# Patient Record
Sex: FEMALE | Race: WHITE | Employment: FULL TIME | URBAN - METROPOLITAN AREA
[De-identification: names, ages, dates, MRNs, and addresses within clinical notes are randomized per-mention and may not be internally consistent; named-entity substitution may affect disease eponyms.]

---

## 2017-01-22 ENCOUNTER — PATIENT MESSAGE (OUTPATIENT)
Dept: FAMILY MEDICINE CLINIC | Facility: CLINIC | Age: 42
End: 2017-01-22

## 2017-01-23 NOTE — TELEPHONE ENCOUNTER
From: Damien Monet  To: Roopa Domínguez NP  Sent: 1/22/2017 7:07 PM CST  Subject: Visit Follow-up Question    Dear Juve Bowser,  I went in to have LFTs drawn on Saturday but they said there is no longer an order for this lab to be draw

## 2017-02-28 ENCOUNTER — OFFICE VISIT (OUTPATIENT)
Dept: FAMILY MEDICINE CLINIC | Facility: CLINIC | Age: 42
End: 2017-02-28

## 2017-02-28 VITALS
TEMPERATURE: 98 F | OXYGEN SATURATION: 99 % | RESPIRATION RATE: 18 BRPM | BODY MASS INDEX: 25.1 KG/M2 | HEIGHT: 64 IN | WEIGHT: 147 LBS | HEART RATE: 76 BPM | DIASTOLIC BLOOD PRESSURE: 72 MMHG | SYSTOLIC BLOOD PRESSURE: 124 MMHG

## 2017-02-28 DIAGNOSIS — J01.40 ACUTE PANSINUSITIS, RECURRENCE NOT SPECIFIED: Primary | ICD-10-CM

## 2017-02-28 PROCEDURE — 99213 OFFICE O/P EST LOW 20 MIN: CPT | Performed by: PHYSICIAN ASSISTANT

## 2017-02-28 RX ORDER — AMOXICILLIN AND CLAVULANATE POTASSIUM 875; 125 MG/1; MG/1
1 TABLET, FILM COATED ORAL 2 TIMES DAILY
Qty: 20 TABLET | Refills: 0 | Status: SHIPPED | OUTPATIENT
Start: 2017-02-28 | End: 2017-03-10

## 2017-02-28 RX ORDER — FLUTICASONE PROPIONATE 50 MCG
2 SPRAY, SUSPENSION (ML) NASAL DAILY
Qty: 16 G | Refills: 0 | Status: SHIPPED | OUTPATIENT
Start: 2017-02-28 | End: 2017-03-05

## 2017-02-28 NOTE — PATIENT INSTRUCTIONS
Acute Sinusitis    Acute sinusitis is irritation and swelling of the sinuses. It is usually caused by a viral infection after a common cold. Your doctor can help you find relief. What is acute sinusitis?   Sinuses are air-filled spaces in the skull behin © 2682-0788 [address], [address]. All rights reserved. This information is not intended as a substitute for professional medical care. Always follow your healthcare professional's instructions.

## 2017-02-28 NOTE — PROGRESS NOTES
Memo Reyes is a 39year old female. Patient presents with:  Sinus Problem: fevers on/off, URI, x 2 weeks    HPI:    Symptoms started 2 weeks ago and worsening. Reports nasal congestion and mild dry cough.  Now with  purulent nasal discharge, maxi Sig: Take 1 tablet by mouth 2 (two) times daily. Fluticasone Propionate 50 MCG/ACT Nasal Suspension 16 g 0      Si sprays by Nasal route daily.            Imaging & Consults:  None    Patient Instructions     Acute Sinusitis    Acute sinusitis i Treatment is aimed at unblocking the sinus opening and helping the cilia work again. You may need to take antihistamine and decongestant medicine. These can reduce inflammation and decrease the amount of fluid your sinuses make.  If you have a bacterial inf

## 2017-03-02 ENCOUNTER — TELEPHONE (OUTPATIENT)
Dept: FAMILY MEDICINE CLINIC | Facility: CLINIC | Age: 42
End: 2017-03-02

## 2017-03-02 NOTE — TELEPHONE ENCOUNTER
Patient called and given instructions. Understands and takes a probiotic- will increase the probiotic to 2 a day while on Augmentin.

## 2017-03-02 NOTE — TELEPHONE ENCOUNTER
Pt should take a probiotic   Take only one abx for 2 days and then increase to bid after 2-3 days on probiotic  All abx can cause diarrhea

## 2017-03-02 NOTE — TELEPHONE ENCOUNTER
Instructed patient to stop medication and we will ask Dr. Karen Martin if she could rx something else.  Please advise

## 2017-04-08 ENCOUNTER — OFFICE VISIT (OUTPATIENT)
Dept: FAMILY MEDICINE CLINIC | Facility: CLINIC | Age: 42
End: 2017-04-08

## 2017-04-08 VITALS
TEMPERATURE: 98 F | DIASTOLIC BLOOD PRESSURE: 64 MMHG | HEART RATE: 74 BPM | HEIGHT: 64 IN | WEIGHT: 150 LBS | RESPIRATION RATE: 16 BRPM | BODY MASS INDEX: 25.61 KG/M2 | SYSTOLIC BLOOD PRESSURE: 122 MMHG

## 2017-04-08 DIAGNOSIS — R30.0 DYSURIA: Primary | ICD-10-CM

## 2017-04-08 PROCEDURE — 99213 OFFICE O/P EST LOW 20 MIN: CPT | Performed by: INTERNAL MEDICINE

## 2017-04-08 PROCEDURE — 81003 URINALYSIS AUTO W/O SCOPE: CPT | Performed by: INTERNAL MEDICINE

## 2017-04-08 PROCEDURE — 87086 URINE CULTURE/COLONY COUNT: CPT | Performed by: INTERNAL MEDICINE

## 2017-04-08 RX ORDER — CIPROFLOXACIN 500 MG/1
500 TABLET, FILM COATED ORAL 2 TIMES DAILY
Qty: 14 TABLET | Refills: 0 | Status: SHIPPED | OUTPATIENT
Start: 2017-04-08 | End: 2017-04-15

## 2017-04-08 NOTE — PROGRESS NOTES
HPI:   Damien Monet is a 39year old female who presents with urinary symptoms for 2 days. Complains of + urinary frequency, + urgency, mild dysuria, + suprapubic pressure. Denies back pain. ++ hematuria. Denies fever.     Diabetic:no     C

## 2018-10-17 ENCOUNTER — TELEPHONE (OUTPATIENT)
Dept: FAMILY MEDICINE CLINIC | Facility: CLINIC | Age: 43
End: 2018-10-17

## 2018-10-17 NOTE — TELEPHONE ENCOUNTER
[facility]ness labs dated 10-3-18 received, reviewed per [facility]: elevated FLTs,  Low platelets, elevated Cholesterol, Repeat CMP, CBC, Lipid in 3 months. Sent to scan, copy on  [facility] Triage Shelf.

## 2019-05-09 ENCOUNTER — OFFICE VISIT (OUTPATIENT)
Dept: FAMILY MEDICINE CLINIC | Facility: CLINIC | Age: 44
End: 2019-05-09
Payer: COMMERCIAL

## 2019-05-09 VITALS
HEART RATE: 68 BPM | WEIGHT: 148.13 LBS | HEIGHT: 64 IN | SYSTOLIC BLOOD PRESSURE: 118 MMHG | OXYGEN SATURATION: 98 % | DIASTOLIC BLOOD PRESSURE: 76 MMHG | BODY MASS INDEX: 25.29 KG/M2 | RESPIRATION RATE: 18 BRPM

## 2019-05-09 DIAGNOSIS — M62.830 SPASM OF LUMBAR PARASPINOUS MUSCLE: Primary | ICD-10-CM

## 2019-05-09 PROCEDURE — 99213 OFFICE O/P EST LOW 20 MIN: CPT | Performed by: PHYSICIAN ASSISTANT

## 2019-05-09 RX ORDER — CYCLOBENZAPRINE HCL 5 MG
5 TABLET ORAL 3 TIMES DAILY PRN
Qty: 15 TABLET | Refills: 0 | Status: SHIPPED | OUTPATIENT
Start: 2019-05-09 | End: 2019-06-26 | Stop reason: ALTCHOICE

## 2019-05-09 NOTE — PROGRESS NOTES
HPI:    Patient ID: Luke Victor is a 40year old female. HPI   Patient presents today for evaluation of low back pain that occurred earlier this week. States on Tuesday she was reaching forward for something when she twisted her lower back.   Pain Musculoskeletal:        Lumbar back: She exhibits tenderness (midline lumbar back), pain and spasm (lumbar paraspinal muscles, left worse than right). She exhibits normal range of motion. Negative straight leg rase bilaterally.  Full active and passive

## 2019-06-26 ENCOUNTER — OFFICE VISIT (OUTPATIENT)
Dept: FAMILY MEDICINE CLINIC | Facility: CLINIC | Age: 44
End: 2019-06-26
Payer: COMMERCIAL

## 2019-06-26 ENCOUNTER — LAB ENCOUNTER (OUTPATIENT)
Dept: LAB | Age: 44
End: 2019-06-26
Attending: FAMILY MEDICINE
Payer: COMMERCIAL

## 2019-06-26 ENCOUNTER — PATIENT MESSAGE (OUTPATIENT)
Dept: FAMILY MEDICINE CLINIC | Facility: CLINIC | Age: 44
End: 2019-06-26

## 2019-06-26 VITALS
HEIGHT: 64 IN | BODY MASS INDEX: 24.1 KG/M2 | HEART RATE: 77 BPM | SYSTOLIC BLOOD PRESSURE: 116 MMHG | OXYGEN SATURATION: 97 % | TEMPERATURE: 98 F | DIASTOLIC BLOOD PRESSURE: 74 MMHG | RESPIRATION RATE: 16 BRPM | WEIGHT: 141.19 LBS

## 2019-06-26 DIAGNOSIS — R79.89 ABNORMAL CBC: ICD-10-CM

## 2019-06-26 DIAGNOSIS — E78.00 ELEVATED CHOLESTEROL: ICD-10-CM

## 2019-06-26 DIAGNOSIS — E78.2 MIXED HYPERLIPIDEMIA: ICD-10-CM

## 2019-06-26 DIAGNOSIS — Z00.00 ROUTINE GENERAL MEDICAL EXAMINATION AT A HEALTH CARE FACILITY: ICD-10-CM

## 2019-06-26 DIAGNOSIS — Z00.00 ROUTINE GENERAL MEDICAL EXAMINATION AT A HEALTH CARE FACILITY: Primary | ICD-10-CM

## 2019-06-26 PROCEDURE — 99396 PREV VISIT EST AGE 40-64: CPT | Performed by: PHYSICIAN ASSISTANT

## 2019-06-26 PROCEDURE — 80050 GENERAL HEALTH PANEL: CPT | Performed by: FAMILY MEDICINE

## 2019-06-26 PROCEDURE — 82306 VITAMIN D 25 HYDROXY: CPT | Performed by: PHYSICIAN ASSISTANT

## 2019-06-26 PROCEDURE — 82607 VITAMIN B-12: CPT | Performed by: PHYSICIAN ASSISTANT

## 2019-06-26 PROCEDURE — 80061 LIPID PANEL: CPT | Performed by: FAMILY MEDICINE

## 2019-06-26 PROCEDURE — 36415 COLL VENOUS BLD VENIPUNCTURE: CPT | Performed by: FAMILY MEDICINE

## 2019-06-26 NOTE — PROGRESS NOTES
HPI:    Patient ID: Cassie Hernandez is a 40year old female. HPI   Patient presents today requesting physical exam. Overall feeling well. No concerns.       Diet: well balanced  Exercise: 3-4 days/week running, [employer] classes, weight  Tobacco use: den clear and moist.   Eyes: Pupils are equal, round, and reactive to light. Conjunctivae are normal.   Neck: Normal range of motion. Neck supple. No thyromegaly present.    Cardiovascular: Normal rate, regular rhythm, normal heart sounds and intact distal puls

## 2019-06-27 NOTE — TELEPHONE ENCOUNTER
From: Gayathri Sanderspool  To: Corrinne Bjornstad  Sent: 6/26/2019 9:24 PM CDT  Subject: Test Results Question    Hello Ms. Reyes,  Thank you for sharing the lab results so quickly.  I am unable to see the test results for the lipid panel, CBC, etc. (but

## 2019-07-06 ENCOUNTER — HOSPITAL ENCOUNTER (OUTPATIENT)
Dept: MAMMOGRAPHY | Age: 44
Discharge: HOME | End: 2019-07-06
Attending: OBSTETRICS & GYNECOLOGY
Payer: COMMERCIAL

## 2019-07-06 PROCEDURE — 77063 BREAST TOMOSYNTHESIS BI: CPT | Performed by: OBSTETRICS & GYNECOLOGY

## 2019-07-06 PROCEDURE — 77067 SCR MAMMO BI INCL CAD: CPT | Performed by: OBSTETRICS & GYNECOLOGY

## 2020-01-29 ENCOUNTER — OFFICE VISIT (OUTPATIENT)
Dept: FAMILY MEDICINE CLINIC | Facility: CLINIC | Age: 45
End: 2020-01-29
Payer: COMMERCIAL

## 2020-01-29 VITALS
WEIGHT: 152.38 LBS | HEIGHT: 65 IN | OXYGEN SATURATION: 99 % | RESPIRATION RATE: 18 BRPM | HEART RATE: 59 BPM | TEMPERATURE: 98 F | DIASTOLIC BLOOD PRESSURE: 80 MMHG | BODY MASS INDEX: 25.39 KG/M2 | SYSTOLIC BLOOD PRESSURE: 130 MMHG

## 2020-01-29 DIAGNOSIS — B35.1 ONYCHOMYCOSIS: Primary | ICD-10-CM

## 2020-01-29 PROCEDURE — 99213 OFFICE O/P EST LOW 20 MIN: CPT | Performed by: PHYSICIAN ASSISTANT

## 2020-01-29 RX ORDER — TERBINAFINE HYDROCHLORIDE 250 MG/1
250 TABLET ORAL DAILY
Qty: 30 TABLET | Refills: 2 | Status: SHIPPED | OUTPATIENT
Start: 2020-01-29 | End: 2021-08-06 | Stop reason: ALTCHOICE

## 2020-01-29 NOTE — PROGRESS NOTES
HPI:    Patient ID: Michael Lewis is a 40year old female. HPI   Patient with history of onychomycosis presents today with recurrence of symptoms.   She has been using various over-the-counter regimens and home remedies but symptoms are not improving

## 2020-03-23 ENCOUNTER — TELEPHONE (OUTPATIENT)
Dept: FAMILY MEDICINE CLINIC | Facility: CLINIC | Age: 45
End: 2020-03-23

## 2020-03-23 DIAGNOSIS — R09.81 NASAL CONGESTION: Primary | ICD-10-CM

## 2020-03-23 PROCEDURE — 99441 PHONE E/M BY PHYS 5-10 MIN: CPT | Performed by: PHYSICIAN ASSISTANT

## 2020-03-23 NOTE — TELEPHONE ENCOUNTER
Virtual/Telephone Check-In    Caty Yolie Loo verbally consents to a Virtual/Telephone Check-In service on 03/23/20. Patient understands and accepts financial responsibility for any deductible, co-insurance and/or co-pays associated with this service.

## 2020-03-23 NOTE — TELEPHONE ENCOUNTER
Pt would like a phone visit with any provider    For 3 weeks pt has   Nasal congestion  fatigue  No taste or smell  No fever      No travel  No contact    Please mc

## 2022-01-04 ENCOUNTER — HOSPITAL ENCOUNTER (OUTPATIENT)
Dept: BONE DENSITY | Age: 47
Discharge: HOME | End: 2022-01-04
Attending: INTERNAL MEDICINE
Payer: COMMERCIAL

## 2022-01-04 ENCOUNTER — HOSPITAL ENCOUNTER (OUTPATIENT)
Dept: MAMMOGRAPHY | Age: 47
Discharge: HOME | End: 2022-01-04
Attending: INTERNAL MEDICINE
Payer: COMMERCIAL

## 2022-01-04 DIAGNOSIS — Z12.31 ENCOUNTER FOR SCREENING MAMMOGRAM FOR MALIGNANT NEOPLASM OF BREAST: ICD-10-CM

## 2022-01-04 DIAGNOSIS — Z13.820 SCREENING FOR OSTEOPOROSIS: ICD-10-CM

## 2022-01-04 DIAGNOSIS — M81.0 OSTEOPOROSIS: ICD-10-CM

## 2022-01-04 DIAGNOSIS — E28.39 PREMATURE OVARIAN FAILURE: ICD-10-CM

## 2022-01-04 DIAGNOSIS — Z78.0 POST-MENOPAUSAL: ICD-10-CM

## 2022-01-04 PROCEDURE — 77063 BREAST TOMOSYNTHESIS BI: CPT | Performed by: INTERNAL MEDICINE

## 2022-01-04 PROCEDURE — 77080 DXA BONE DENSITY AXIAL: CPT | Performed by: INTERNAL MEDICINE

## 2022-01-04 PROCEDURE — 77067 SCR MAMMO BI INCL CAD: CPT | Performed by: INTERNAL MEDICINE

## 2024-02-22 ENCOUNTER — HOSPITAL ENCOUNTER (OUTPATIENT)
Dept: MAMMOGRAPHY | Age: 49
Discharge: HOME | End: 2024-02-22
Attending: OBSTETRICS & GYNECOLOGY
Payer: COMMERCIAL

## 2024-02-22 DIAGNOSIS — Z92.89 HISTORY OF MAMMOGRAPHY, SCREENING: ICD-10-CM

## 2024-02-22 PROCEDURE — 77063 BREAST TOMOSYNTHESIS BI: CPT | Performed by: OBSTETRICS & GYNECOLOGY

## 2024-02-22 PROCEDURE — 77067 SCR MAMMO BI INCL CAD: CPT | Performed by: OBSTETRICS & GYNECOLOGY

## 2024-04-15 ENCOUNTER — TELEPHONE (OUTPATIENT)
Dept: HEMATOLOGY/ONCOLOGY | Facility: HOSPITAL | Age: 49
End: 2024-04-15

## 2024-05-29 ENCOUNTER — NURSE ONLY (OUTPATIENT)
Dept: HEMATOLOGY/ONCOLOGY | Age: 49
End: 2024-05-29
Attending: GENETIC COUNSELOR, MS

## 2024-05-29 ENCOUNTER — GENETICS ENCOUNTER (OUTPATIENT)
Dept: GENETICS | Age: 49
End: 2024-05-29
Attending: GENETIC COUNSELOR, MS

## 2024-05-29 DIAGNOSIS — Z80.41 FAMILY HISTORY OF OVARIAN CANCER: Primary | ICD-10-CM

## 2024-05-29 DIAGNOSIS — Z80.0 FAMILY HISTORY OF PANCREATIC CANCER: ICD-10-CM

## 2024-05-29 PROCEDURE — 36415 COLL VENOUS BLD VENIPUNCTURE: CPT

## 2024-05-29 PROCEDURE — 96040 HC GENETIC COUNSELING EA 30 MIN: CPT | Performed by: GENETIC COUNSELOR, MS

## 2024-05-29 NOTE — PROGRESS NOTES
Referring Provider:  Mackenzie Martinez MD    Additional Provider(s):  Pau Jauregui MD (fax: [phone])    Reason for Referral:  Johanna Loo was referred for genetic counseling because of a family history of ovarian cancer. Ms. Loo is a 49 year-old woman of mixed  descent with who has no personal history of cancer. Ms. Loo's ovaries are intact. Menarche was at age 12. Spontaneous menopause was at age 41 or 42. Ms. Loo's last mammogram was on 24. Ms. Loo has not had a colonoscopy.    Social History:  Ms. Loo was seen today by herself. Ms. Loo lives in [place]. She is a school nurse.     Family History:   A three generation pedigree was obtained.      Ms. Loo has three daughters, ages 14, 18, and 20.     Ms. Loo has one younger brother and one younger sister.     Ms. Loo's mother is 77 years-old and has not had cancer. Ms. Loo's mother has one older brother and no sisters. Ms. oLo's maternal uncle has metastatic prostate cancer. Ms. Loo's maternal grandmother  at age 94 and did not have cancer. Ms. Loo's maternal grandfather had prostate cancer at an unspecified age and  from lung cancer in his 80s.     Ms. Loo's father is 78 years-old and has not had cancer. Ms. Loo's father had three sisters and no brothers. One of Ms. Loo's paternal aunts  from ovarian cancer in her 70s. The son of this aunt  in his 60s from pancreatic cancer. Ms. Loo's paternal grandmother  in her 70s either from uterine or another gynecological cancer. Ms. Loo's paternal grandfather  at an unspecified young age and did not have cancer.     Please see the pedigree for additional family history information.     Counseling:   The following information was discussed with Ms. Loo.    Genetics of Breast and Ovarian Cancer:  In the [place], approximately 1 in 8 women will develop breast cancer and 1 in 70-75 women will  develop ovarian cancer.  Although the majority of breast and ovarian cancer cases are sporadic, approximately 5-10% of women with breast cancer and 20-24% of women with ovarian cancer have a hereditary cancer syndrome.  Signs of a hereditary cancer syndrome include some rare cancers, common cancers occurring at unusually young ages, multiple primary cancers in the same individual, or the same type of cancer or related cancers (e.g., breast and ovarian, colorectal and endometrial) in three or more individuals in the same lineage.  Mutations in the genes, BRCA1 and BRCA2, account for the majority of hereditary breast and ovarian cancer families.  Mutations in genes other than BRCA1/2, many of which now have medical management recommendations (e.g., ESTRELLA, CHEK2, PALB2) are identified in 3-10% of individuals tested using a multigene panel.      Risk Assessment:   Ms. Loo meets NCCN Guidelines testing criteria for ovarian cancer susceptibility genes based on her reported paternal family history of a second-degree relative (paternal aunt) with ovarian cancer. I recommend that testing be performed as part of a multigene panel.     Genetic Testing (Panel):  The pros, cons, and limitations of genetic testing were discussed including the potential implications of test results on clinical management.     If a pathogenic variant is not identified (negative result), it is still possible that Ms. Loo has a pathogenic variant in one of these genes that was not detected by the genetic test, or that the family is dealing with a hereditary cancer syndrome involving a different gene. It is also possible that Ms. Loo's relatives have a pathogenic variant in one of these genes that Ms. Loo did not inherit. In this scenario, options for cancer screening/management should be determined according to personal and family histories and should be discussed with a physician.      A variant of uncertain significance is a DNA  change that may or may not alter the function of the gene; therefore, it is usually not possible to determine if the gene variant is responsible for an individual's increased cancer risk.     If Ms. Loo is found to carry a pathogenic variant in a cancer predisposition gene, she is at significantly increased risk for various cancers. The magnitude of these risks, and the cancers for which she is at increased risk would depend on the gene involved. Medical recommendations for individuals with BRCA1/2 pathogenic variants were reviewed as an example. It was also explained that for some of the genes for which testing is available, the associated cancer risks have yet to be determined and medical management recommendations may not yet be available for individuals with pathogenic variants in these genes. If she were to test positive for a pathogenic variant, her children and siblings would each have a 50% chance of carrying the same variant. At-risk adults (>18) would have the option of pursuing targeted genetic testing to clarify their cancer risks. Genetic test results have implications for the entire biological family. Thus, it is recommended that she share her genetic test results with her biological family members so that they may have their risk assessed.     Genetic Information Non-Discrimination Act:  The legal protections of the Genetic Information Nondiscrimination Act (WHIT) for health insurance and employment were discussed.  WHIT does not provide protection for life insurance, disability or long-term care insurance.    Summary and Plan:  Ms. Loo was referred for genetic counseling because of a family history of ovarian cancer. Her reported paternal family history is suspicious for a hereditary cancer syndrome. Genetic testing on Ms. Loo for ovarian cancer susceptibility genes is indicated.      At the conclusion of the counseling session Ms. Loo decided to proceed with genetic testing. Written  consent was obtained. Blood and paperwork were sent to [facility] for their Breast/Gyn Guidelines panel. I anticipate that Ms. Loo's results will be available within 2-3 weeks and will call her with the results.  Results will also be communicated to Dr. Martinez and Dr. Jauregui.    Approximately 40 minutes was spent in consultation with Ms. Loo.

## 2024-06-13 ENCOUNTER — GENETICS ENCOUNTER (OUTPATIENT)
Dept: HEMATOLOGY/ONCOLOGY | Facility: HOSPITAL | Age: 49
End: 2024-06-13

## 2024-06-13 NOTE — PROGRESS NOTES
Referring Provider:                    Mackenzie Martinez MD     Additional Provider(s):              Pau Jauregui MD (fax: [phone])    Reason for Referral:  Johanna Loo had genetic testing performed on 05/29/24 because of a family history of cancer.     Genetic Testing Result:  NEGATIVE - No known pathogenic variants were found in the following 19 genes: ESTRELLA, BARD1, BRCA1, BRCA2, BRIP1, CDH1, CHEK2, MLH1, MSH2 (including EPCAM rearrangement testing), MSH6, NF1, PALB2, PMS2, PTEN, RAD51C, RAD51D, STK11, and TP53.  Please refer to the report from [employer] ([id number]) for additional testing information. These results were discussed with Ms. Loo by phone on 06/12/24.    Summary and Plan:  These results indicate that it is unlikely that Ms. Loo has a pathogenic variant in any of the genes listed above. The limitations of the testing include the chance that a pathogenic variant in a gene other than those included in this analysis might be the cause of cancer in Ms. Loo's relatives. I encouraged Ms. Loo to contact me on an annual basis to see if there have been any updates in genetic testing that would apply to her or to inform me if there are any changes to the family history.    In the meantime, Ms. Loo and her relatives should speak with their physicians to discuss recommended medical management according to their personal and family history. Ms. Turners lifetime risk for breast cancer is estimated to be 7.6% based on Tyrer-Cuzick model, version 8.       Cc:  Johanna Florianleighadorita

## (undated) NOTE — LETTER
02/28/20        Ryan Benites [address]  [place] [address]      Dear Dimas Saini,    Our records indicate that you have outstanding lab work and or testing that was ordered for you and has not yet been completed:  Orders Placed This Encounte

## (undated) NOTE — LETTER
10/28/19        [address]  [place] [address]      Dear Thuy Hooper,    Our records indicate that you have outstanding lab work and or testing that was ordered for you and has not yet been completed:  Orders Placed This Encounte

## (undated) NOTE — MR AVS SNAPSHOT
[address]  [address] [address] [phone]               Thank you for choosing us for your health care visit with Alf Velasquez NP.   We are glad to serve you and happy to provide you with BILIRUBIN neg Negative    KETONES (URINE DIPSTICK) neg Negative mg/dL    SPECIFIC GRAVITY 1.010 1.005 - 1.030    OCCULT BLOOD large Negative    PH, URINE 7.0 4.5 - 8.0    PROTEIN (URINE DIPSTICK) 30 Negative/Trace mg/dL    UROBILINOGEN,SEMI-QN 0.2 0.0 - 1

## (undated) NOTE — MR AVS SNAPSHOT
[facility] [address] [facility]  [address] [facility]  [place] [address]  [phone]               Thank you for choosing us for your health care visit with Jena Davidson PA-C. We are glad to serve you and happy to provide you with this summary of your visit.   [place] · Fluid draining in the back of the throat (postnasal drip)  · Congestion  · Drainage that is thick and colored, instead of clear  · Cough  Diagnosing acute sinusitis  Your doctor will ask about your symptoms and health history. He or she will look at your These medications [address], [phone], [address] [address] [address]    Hours:  24-hours Phone:  [phone]    - Amoxicillin-Pot Clavulanate [phone] Start activities slowly and build up over time Do what you like   Get your heart pumping – brisk walking, biking, swimming Even 10 minute increments are effective and add up over the week   2 ½ hours per week – spread out over time Use a ernestina to keep you